# Patient Record
Sex: MALE | Race: WHITE | URBAN - METROPOLITAN AREA
[De-identification: names, ages, dates, MRNs, and addresses within clinical notes are randomized per-mention and may not be internally consistent; named-entity substitution may affect disease eponyms.]

---

## 2017-03-18 ENCOUNTER — RADIANT APPOINTMENT (OUTPATIENT)
Dept: GENERAL RADIOLOGY | Facility: CLINIC | Age: 32
End: 2017-03-18
Attending: FAMILY MEDICINE
Payer: COMMERCIAL

## 2017-03-18 ENCOUNTER — OFFICE VISIT (OUTPATIENT)
Dept: URGENT CARE | Facility: URGENT CARE | Age: 32
End: 2017-03-18
Payer: COMMERCIAL

## 2017-03-18 VITALS
SYSTOLIC BLOOD PRESSURE: 130 MMHG | DIASTOLIC BLOOD PRESSURE: 80 MMHG | OXYGEN SATURATION: 98 % | TEMPERATURE: 97.6 F | HEART RATE: 104 BPM

## 2017-03-18 DIAGNOSIS — M54.9 BACK PAIN, UNSPECIFIED BACK LOCATION, UNSPECIFIED BACK PAIN LATERALITY, UNSPECIFIED CHRONICITY: ICD-10-CM

## 2017-03-18 DIAGNOSIS — M54.9 BACK PAIN, UNSPECIFIED BACK LOCATION, UNSPECIFIED BACK PAIN LATERALITY, UNSPECIFIED CHRONICITY: Primary | ICD-10-CM

## 2017-03-18 PROCEDURE — 99213 OFFICE O/P EST LOW 20 MIN: CPT | Performed by: FAMILY MEDICINE

## 2017-03-18 PROCEDURE — 72072 X-RAY EXAM THORAC SPINE 3VWS: CPT

## 2017-03-18 RX ORDER — OXYCODONE AND ACETAMINOPHEN 5; 325 MG/1; MG/1
1 TABLET ORAL EVERY 4 HOURS PRN
Qty: 18 TABLET | Refills: 0 | Status: SHIPPED | OUTPATIENT
Start: 2017-03-18

## 2017-03-18 RX ORDER — PREDNISONE 20 MG/1
40 TABLET ORAL DAILY
Qty: 10 TABLET | Refills: 0 | Status: SHIPPED | OUTPATIENT
Start: 2017-03-18 | End: 2017-03-23

## 2017-03-18 NOTE — NURSING NOTE
"Chief Complaint   Patient presents with     Trauma     Back and Neck injury        Initial /80 (BP Location: Right arm, Patient Position: Chair, Cuff Size: Adult Large)  Pulse 104  Temp 97.6  F (36.4  C) (Oral)  SpO2 98% Estimated body mass index is 27.25 kg/(m^2) as calculated from the following:    Height as of 10/1/12: 6' 3\" (1.905 m).    Weight as of 10/1/12: 218 lb (98.9 kg).  Medication Reconciliation: complete     Jolie Bloom CMA (AAMA)        "

## 2017-03-18 NOTE — MR AVS SNAPSHOT
"              After Visit Summary   3/18/2017    Mickey Boggs    MRN: [id number]           Patient Information     Date Of Birth          1985        Visit Information        Provider Department      3/18/2017 3:30 PM Ned Hill MD [facility]        Today's Diagnoses     Back pain, unspecified back location, unspecified back pain laterality, unspecified chronicity    -  1       Follow-ups after your visit        Who to contact     If you have questions or need follow up information about today's clinic visit or your schedule please contact [facility] directly at [phone].  Normal or non-critical lab and imaging results will be communicated to you by MyChart, letter or phone within 4 business days after the clinic has received the results. If you do not hear from us within 7 days, please contact the clinic through [employer] or phone. If you have a critical or abnormal lab result, we will notify you by phone as soon as possible.  Submit refill requests through [employer]Go or call your pharmacy and they will forward the refill request to us. Please allow 3 business days for your refill to be completed.          Additional Information About Your Visit        MyChart Information     [employer]Go lets you send messages to your doctor, view your test results, renew your prescriptions, schedule appointments and more. To sign up, go to [email or web address] . Click on \"Log in\" on the left side of the screen, which will take you to the Welcome page. Then click on \"Sign up Now\" on the right side of the page.     You will be asked to enter the access code listed below, as well as some personal information. Please follow the directions to create your username and password.     Your access code is: [email or web address]  Expires: 2017  1:26 AM     Your access code will  in 90 days. If you need help or a new code, please call your [facility] clinic or [phone].        Care " EveryWhere ID     This is your Care EveryWhere ID. This could be used by other organizations to access your [facility] medical records  [id number]        Your Vitals Were     Pulse Temperature Pulse Oximetry             104 97.6  F (36.4  C) (Oral) 98%          Blood Pressure from Last 3 Encounters:   03/18/17 130/80   10/01/12 130/66   08/27/12 137/81    Weight from Last 3 Encounters:   10/01/12 218 lb (98.9 kg)   09/20/12 222 lb 1.6 oz (100.7 kg)   10/31/11 225 lb 1.6 oz (102.1 kg)                 Today's Medication Changes          These changes are accurate as of: 3/18/17 11:59 PM.  If you have any questions, ask your nurse or doctor.               Start taking these medicines.        Dose/Directions    oxyCODONE-acetaminophen 5-325 MG per tablet   Commonly known as:  PERCOCET   Used for:  Back pain, unspecified back location, unspecified back pain laterality, unspecified chronicity   Started by:  Ned Hill MD        Dose:  1 tablet   Take 1 tablet by mouth every 4 hours as needed for pain maximum 3 tablet(s) per day   Quantity:  18 tablet   Refills:  0       predniSONE 20 MG tablet   Commonly known as:  DELTASONE   Used for:  Back pain, unspecified back location, unspecified back pain laterality, unspecified chronicity   Started by:  Ned Hill MD        Dose:  40 mg   Take 2 tablets (40 mg) by mouth daily for 5 days   Quantity:  10 tablet   Refills:  0            Where to get your medicines      These medications were sent to [facility]/pharmacy #[id number] - [place], [place] - [address]  [address] [address] [address]    Hours:  [facility] [employer] Phone:  [phone]     predniSONE 20 MG tablet         Some of these will need a paper prescription and others can be bought over the counter.  Ask your nurse if you have questions.     Bring a paper prescription for each of these medications     oxyCODONE-acetaminophen 5-325 MG per tablet                Primary Care Provider Office Phone  # Fax #    Oscar Bey [phone]       [facility] [address] [address] [address]        Thank you!     Thank you for choosing [facility]  for your care. Our goal is always to provide you with excellent care. Hearing back from our patients is one way we can continue to improve our services. Please take a few minutes to complete the written survey that you may receive in the mail after your visit with us. Thank you!             Your Updated Medication List - Protect others around you: Learn how to safely use, store and throw away your medicines at www.disposemymeds.org.          This list is accurate as of: 3/18/17 11:59 PM.  Always use your most recent med list.                   Brand Name Dispense Instructions for use    blood glucose monitoring test strip    no brand specified     by In Vitro route every hour as needed Reported on 3/18/2017       GLUCAGON EMERGENCY 1 MG kit   Generic drug:  glucagon     1    use with severe hypoglycemia as directed       HYDROcodone-acetaminophen 5-325 MG per tablet    NORCO    30 tablet    Take 1-2 tablets by mouth every 4 hours as needed for other (Moderate to Severe Pain).       insulin aspart 100 UNITS/ML injection    NovoLOG FLEXpen    15 mL    Inject  Subcutaneous. 1 unit per 15 grams of carbs       insulin glargine 100 UNIT/ML injection    LANTUS SOLOSTAR    3 Month    Inject  Subcutaneous. 34 Units in AM and 32 Units in PM       KEPPRA 500 MG tablet   Generic drug:  levETIRAcetam      1 TABLET TWICE DAILY       KETOSTIX Strp   Generic drug:  acetone (Urine) test      Reported on 3/18/2017       LAMICTAL 200 MG tablet   Generic drug:  lamoTRIgine      1 TABLET TWICE DAILY       Multi-vitamin Tabs tablet   Generic drug:  multivitamin, therapeutic with minerals      1 TABLET DAILY       ondansetron 8 MG ODT tab    ZOFRAN-ODT    10 tablet    Take 1 tablet by mouth every 8 hours as needed for nausea.        "oxyCODONE-acetaminophen 5-325 MG per tablet    PERCOCET    18 tablet    Take 1 tablet by mouth every 4 hours as needed for pain maximum 3 tablet(s) per day       pen needles 5/16\" 31G X 8 MM Misc     100 each    1 each daily as needed. Change needle every injection       predniSONE 20 MG tablet    DELTASONE    10 tablet    Take 2 tablets (40 mg) by mouth daily for 5 days         "

## 2017-03-18 NOTE — PROGRESS NOTES
/\  SUBJECTIVE:                                                    Mickey Boggs is a 31 year old male who presents to clinic today for the following health issues:      Back Pain and Neck injury     Duration: x3 days        Specific cause: lifting    Description:   Location of pain: middle of back left, upper back left and neck left  Character of pain: sharp  Pain radiation:none and radiates to knees  New numbness or weakness in legs, not attributed to pain:  YES    Intensity: Currently 9/10    History:   Pain interferes with job: YES  History of back problems: previous spinal surgery - Back Fusion L4-S1  Any previous MRI or X-rays: None  Sees a specialist for back pain:  No  Therapies tried without relief: acetaminophen (Tylenol)    Alleviating factors:   Improved by: none      Precipitating factors:  Worsened by: Lifting, Bending, Standing, Sitting, Lying Flat and Walking    Functional and Psychosocial Screen (Flash STarT Back):      Not performed today       Accompanying Signs & Symptoms:  Risk of Fracture:  None  Risk of Cauda Equina:  None  Risk of Infection:  None  Risk of Cancer:  None  Risk of Ankylosing Spondylitis:  Onset at age <35, male, AND morning back stiffness. no                      OBJECTIVE:  Vital signs as noted above. Patient appears to be in mild to moderate pain, there is tenderness to palpation in the rhomboid muscle on the left. The pain extends from mid thoracic To the left trapezius muscle. He has decreased range of motion in his shoulder. Distal CMS however is negative    Thoracic spine showed no obvious abnormalities    PLAN:  Acute back pain strain    Place ice on the area twice a day  See the above medications  Gentle stretching    Follow up with her primary care in the week